# Patient Record
(demographics unavailable — no encounter records)

---

## 2025-05-08 NOTE — PHYSICAL EXAM
[No Acute Distress] : no acute distress [Normal Oropharynx] : normal oropharynx [Normal Rate/Rhythm] : normal rate/rhythm [Normal S1, S2] : normal s1, s2 [No Resp Distress] : no resp distress [Clear to Auscultation Bilaterally] : clear to auscultation bilaterally [No Abnormalities] : no abnormalities [No Edema] : no edema

## 2025-05-08 NOTE — REVIEW OF SYSTEMS
Patient: Risa Hines    Procedure Summary       Date: 02/05/24 Room / Location: [facility] 03 / [facility]    Anesthesia Start: 1157 Anesthesia Stop: 1427    Procedure: ARTHROPLASTY TOTAL KNEE (Right: Knee) Diagnosis:       Acute pain of right knee      (Acute pain of right knee [M25.561])    Surgeons: Monico Castillo MD Responsible Provider: SAMANTHA Hyman    Anesthesia Type: spinal, regional ASA Status: 3            Anesthesia Type: spinal, regional    Vitals Value Taken Time   /60 02/05/24 1549   Temp 36.6 °C (97.9 °F) 02/05/24 1420   Pulse 83 02/05/24 1526   Resp 14 02/05/24 1520   SpO2 95 % 02/05/24 1559   Vitals shown include unvalidated device data.    Anesthesia Post Evaluation    Patient location during evaluation: PACU  Patient participation: complete - patient participated  Level of consciousness: awake and alert  Pain management: satisfactory to patient  Airway patency: patent  Cardiovascular status: acceptable and blood pressure returned to baseline  Respiratory status: acceptable  Hydration status: acceptable  Postoperative Nausea and Vomiting: none        There were no known notable events for this encounter.     [Negative] : Endocrine

## 2025-05-08 NOTE — HISTORY OF PRESENT ILLNESS
[Current] : current [Never] : never [TextBox_4] : 68 year-old male current smoker past medical history significant for chronic cough, history of restrictive lung disease history of a pulmonary nodule. COPD/Emphysema

## 2025-05-08 NOTE — ASSESSMENT
[FreeTextEntry1] : 68 year-old male current smoker past medical history significant for chronic cough, history of restrictive lung disease history of pulmonary nodules,. COPD/Emphysema  Reports being in baseline state of health. Chronic cough, intermittently productive. on Stiolto.  Continues to smoke daily.    On exam, lungs are clear, no wheezing or rhonchi, in NAD.  In office PFT completed - FEV1/FVC 76, 98% of predicted. reversible. increase % of predicted. increased % of predicted. severe diffusion defect. Feno - 7 PPB    Care plans discussed - will order for CT lung screen. Continue on Stioloto 2.5-2.5 mcg 2 puffs daily. Follow-up in 1 month.